# Patient Record
(demographics unavailable — no encounter records)

---

## 2024-10-17 NOTE — HISTORY OF PRESENT ILLNESS
[FreeTextEntry8] : Mr. Gonsalez is a 64 y/o [place] speaking male with history of  Concerned today regarding progressive R shoulder pain for x 2 weeks. Patient had been gardening, fell on concrete sustaining direct contact to the R shoulder and ribs. Range of motion has been restricted by pain. Point tenderness is palpable along R shoulder.  Advised to RTC in 1 mo for CPE- last visit

## 2024-12-10 NOTE — ASSESSMENT
[FreeTextEntry1] : 63M w/ DM, degenerate spine disease and non-obstructive CAD and HFrEF 30% Mixed NICM/ICM who presents for follow up   #Hx of Non-Obstructive CAD - LHC neg  > mod RCA disease - Cont ASA Statin  #HFrEF - Pt with newly reduced EF 30% from 50% with global rWMA - TTE: EF 30% globally reduced - Cath: 6/2024: 40% RCA IFR neg, mild LAD - CMR: Patchy subepicardial gadolinium enhancement c/w past viral myocarditis, EF 30% - GDMT:  > BB: MTP Succ 25 QDay  > ACE/ARB/ARNI: Lisinopril 5 QDay  > MRA: Spironolactone 25 QDay  > SGLT2: Could not afford, hold off for now  - Volume Status: euvolemic - Afterload: None - Diuretics: None Plan: - F/U repeat TTE on GDMT to look for interval improvement, can consider PPx AICD if EF remains <30% - No need for diuretics at this time - Encouraged to continue WL and good habits/diet - Consider lab work up and genetic work up of NICM at next visit if EF not improved   RTC 6 mths No labs no meds  TTE prior   Fredy Stacy PGY6 Cardiology Fellow  [facility]AB[facility] Maki

## 2024-12-10 NOTE — END OF VISIT
[] : Fellow [FreeTextEntry3] : Cardiac MRI reviewed with Imaging specialist Dr. Masterson. Suggestive of myocarditis. Plan for repeat TTE after 3 months of GDMT. If LVEF does not improve, will send a broader work-up of non-ischemic cardiomyopathy and consider genetic testing/ICD referral. Ensure patient is prescribed statin.

## 2024-12-10 NOTE — REASON FOR VISIT
[FreeTextEntry1] : 63M w/ DM, degenerate spine disease and non-obstructive CAD and HFrEF 30% Mixed NICM/ICM who presents for follow up   V 8/2024: Started low salt low fat diet, lost 30 lbs, refill Lipitor, can walk 4 blocks, tolerating GDMT, pending CMR, starting jardiance 10 QDay   IE: CMR, Following medicine, found to have torn rotator cuff, referred to ortho and given medical therapy/PT   Today patient reports he is doing ok. He notes he feels SOB in general during the winter, this is unchanged for years, he noted this occurs with rest and exertion day and night. It has no exertional component and always resolves when it gets warm. The cold makes it worse and he feels like he cannot pass air into his lungs, and denies any hx of asthma but has never been worked up. Regarding his HFrEF he never got the jardiance due to cost, however he endorses compliance with his other home meds. He has not had any LE edema orthopnea or PND. He forgot his BP log but thinks it is 110s/70s when he checks. No dizziness or syncope has occurred. His only complaint at this time is his torn rotator cuff that hurts constantly and he notes he cannot exercise, as well as BL hand numbness, denies any C spine issues. Will plan on continuing GDMT, repeat TTE to look for interval change in EF    CMR PROCEDURE DATE:  10/17/2024  INTERPRETATION:  HISTORY: Nonischemic cardiomyopathy workup. Coronary catheterization performed 6/17/2024 demonstrated 40% stenosis of the RCA. Echocardiogram performed to 2/12/2024 demonstrated left ventricular systolic function of 30% with global left ventricular hypokinesis and normal cavity size.  EXAMINATION: Cardiac MRI without and with Gadolinium.  TECHNIQUE: Cardiac MRI was performed before and after the administration of intravenous gadolinium. BSA 2.06 m2 based on height of 68 inches and weight of 220 pounds. FINDINGS: Chambers: The left ventricular cavity is normal in size. There is no left ventricular hypertrophy/thickening; for example the basal anterior wall of the left ventricle measures 9 mm in thickness at end diastole. The basal and mid lateral walls of the left ventricle appears dyskinetic and the remainder left ventricular myocardial wall segments appear globally hypokinetic. The left atrium is normal in size. The right ventricle is normal in size. There are no focal right ventricular wall motion abnormalities and function appears preserved The right atrium is normal in size. No immediate resting perfusion defects identified within the left ventricular myocardium after administration of gadolinium. There is subepicardial late gadolinium enhancement involving the basal and mid anterolateral and inferolateral walls of the left ventricle. LVEDV is 136 mL Index LVEDV is 66 mL/m2 LVESV is 95 mL Index LVESV is 46 mL/m2 LVSV is 41 mL LVEF is 30% Valves: Phase contrast sequence at the level of the aortic valve demonstrates total forward flow of 51 mL per beat and 2 mL of backward flow. The maximum velocity is 78 cm/s and maximum pressure gradient is 2 mmHg. No CMR evidence of valvular abnormality. Vessels: Unremarkable. Thorax: No pericardial effusion. No pleural effusion. No large mediastinal or axillary nodes identified. Imaged Abdomen: Unremarkable.  IMPRESSION:. There is subepicardial scarring involving the basal and mid anterolateral and inferolateral walls of the left ventricle. This finding is most compatible with viral myocarditis. The left ventricular basal and mid lateral walls appear dyskinetic and the remainder of the left ventricular myocardial wall segments appear hypokinetic. The calculated left ventricular ejection fraction is 30%.

## 2024-12-17 NOTE — PHYSICAL EXAM
[No Acute Distress] : no acute distress [Well Nourished] : well nourished [Well Developed] : well developed [Normal Sclera/Conjunctiva] : normal sclera/conjunctiva [EOMI] : extraocular movements intact [No Respiratory Distress] : no respiratory distress  [No Accessory Muscle Use] : no accessory muscle use [Clear to Auscultation] : lungs were clear to auscultation bilaterally [Normal Rate] : normal rate  [Regular Rhythm] : with a regular rhythm [Normal S1, S2] : normal S1 and S2 [No Murmur] : no murmur heard [Coordination Grossly Intact] : coordination grossly intact [No Focal Deficits] : no focal deficits [Normal Gait] : normal gait [Speech Grossly Normal] : speech grossly normal [Memory Grossly Normal] : memory grossly normal [Normal Affect] : the affect was normal [Alert and Oriented x3] : oriented to person, place, and time [Normal Mood] : the mood was normal [Normal Insight/Judgement] : insight and judgment were intact [de-identified] : in pain [de-identified] : pain to palpation and passive raise of left shoulder [de-identified] : no overlying rash, erythema or induration in left shoulder

## 2024-12-17 NOTE — HISTORY OF PRESENT ILLNESS
[FreeTextEntry1] : CPE  [de-identified] : James is 63 y.o M with a PMHx of newly diagnosed HFrEF of 30% and partial tear of the right shoulder who presents to the clinic for a CPE. He has had multiple ED visits regarding his left shoulder as it has been causing excruciating pain. MRI showed a lot of fissures, but patient cannot do PT because the the pain is too much. Pt states that the meloxicam and naproxen that he was prescribed does not alleviate much pain. Pt describes the pain as a sharp, constant pain, states that moving it around worsens the pain and nothing makes it better. Pt has tried cold and hot water and creams for the pain, denies using any steroid injections. Pt has an appt on the 15th with the orthopedist to assess whether surgery is necessary.

## 2024-12-17 NOTE — PHYSICAL EXAM
[No Acute Distress] : no acute distress [Well Nourished] : well nourished [Well Developed] : well developed [Normal Sclera/Conjunctiva] : normal sclera/conjunctiva [EOMI] : extraocular movements intact [No Respiratory Distress] : no respiratory distress  [No Accessory Muscle Use] : no accessory muscle use [Clear to Auscultation] : lungs were clear to auscultation bilaterally [Normal Rate] : normal rate  [Regular Rhythm] : with a regular rhythm [Normal S1, S2] : normal S1 and S2 [No Murmur] : no murmur heard [Coordination Grossly Intact] : coordination grossly intact [No Focal Deficits] : no focal deficits [Normal Gait] : normal gait [Speech Grossly Normal] : speech grossly normal [Memory Grossly Normal] : memory grossly normal [Normal Affect] : the affect was normal [Alert and Oriented x3] : oriented to person, place, and time [Normal Mood] : the mood was normal [Normal Insight/Judgement] : insight and judgment were intact [de-identified] : in pain [de-identified] : pain to palpation and passive raise of left shoulder [de-identified] : no overlying rash, erythema or induration in left shoulder

## 2024-12-17 NOTE — HISTORY OF PRESENT ILLNESS
[FreeTextEntry1] : CPE  [de-identified] : James is 63 y.o M with a PMHx of newly diagnosed HFrEF of 30% and partial tear of the right shoulder who presents to the clinic for a CPE. He has had multiple ED visits regarding his left shoulder as it has been causing excruciating pain. MRI showed a lot of fissures, but patient cannot do PT because the the pain is too much. Pt states that the meloxicam and naproxen that he was prescribed does not alleviate much pain. Pt describes the pain as a sharp, constant pain, states that moving it around worsens the pain and nothing makes it better. Pt has tried cold and hot water and creams for the pain, denies using any steroid injections. Pt has an appt on the 15th with the orthopedist to assess whether surgery is necessary.

## 2024-12-17 NOTE — HISTORY OF PRESENT ILLNESS
[FreeTextEntry1] : CPE  [de-identified] : James is 63 y.o M with a PMHx of newly diagnosed HFrEF of 30% and partial tear of the right shoulder who presents to the clinic for a CPE. He has had multiple ED visits regarding his left shoulder as it has been causing excruciating pain. MRI showed a lot of fissures, but patient cannot do PT because the the pain is too much. Pt states that the meloxicam and naproxen that he was prescribed does not alleviate much pain. Pt describes the pain as a sharp, constant pain, states that moving it around worsens the pain and nothing makes it better. Pt has tried cold and hot water and creams for the pain, denies using any steroid injections. Pt has an appt on the 15th with the orthopedist to assess whether surgery is necessary.

## 2024-12-17 NOTE — END OF VISIT
[] : Resident [FreeTextEntry3] : Pt in obvious pain, holding right shoulder and in distress. will s/w his cardiol about options for surgery eg cardiac anesthesia or local block, will also see Ortho next mo can try to move up the visit.

## 2024-12-17 NOTE — HISTORY OF PRESENT ILLNESS
[FreeTextEntry1] : CPE  [de-identified] : James is 63 y.o M with a PMHx of newly diagnosed HFrEF of 30% and partial tear of the right shoulder who presents to the clinic for a CPE. He has had multiple ED visits regarding his left shoulder as it has been causing excruciating pain. MRI showed a lot of fissures, but patient cannot do PT because the the pain is too much. Pt states that the meloxicam and naproxen that he was prescribed does not alleviate much pain. Pt describes the pain as a sharp, constant pain, states that moving it around worsens the pain and nothing makes it better. Pt has tried cold and hot water and creams for the pain, denies using any steroid injections. Pt has an appt on the 15th with the orthopedist to assess whether surgery is necessary.

## 2024-12-17 NOTE — PHYSICAL EXAM
[No Acute Distress] : no acute distress [Well Nourished] : well nourished [Well Developed] : well developed [Normal Sclera/Conjunctiva] : normal sclera/conjunctiva [EOMI] : extraocular movements intact [No Respiratory Distress] : no respiratory distress  [No Accessory Muscle Use] : no accessory muscle use [Clear to Auscultation] : lungs were clear to auscultation bilaterally [Normal Rate] : normal rate  [Regular Rhythm] : with a regular rhythm [Normal S1, S2] : normal S1 and S2 [No Murmur] : no murmur heard [Coordination Grossly Intact] : coordination grossly intact [No Focal Deficits] : no focal deficits [Normal Gait] : normal gait [Speech Grossly Normal] : speech grossly normal [Memory Grossly Normal] : memory grossly normal [Normal Affect] : the affect was normal [Alert and Oriented x3] : oriented to person, place, and time [Normal Mood] : the mood was normal [Normal Insight/Judgement] : insight and judgment were intact [de-identified] : in pain [de-identified] : pain to palpation and passive raise of left shoulder [de-identified] : no overlying rash, erythema or induration in left shoulder

## 2024-12-17 NOTE — PHYSICAL EXAM
[No Acute Distress] : no acute distress [Well Nourished] : well nourished [Well Developed] : well developed [Normal Sclera/Conjunctiva] : normal sclera/conjunctiva [EOMI] : extraocular movements intact [No Respiratory Distress] : no respiratory distress  [No Accessory Muscle Use] : no accessory muscle use [Clear to Auscultation] : lungs were clear to auscultation bilaterally [Normal Rate] : normal rate  [Regular Rhythm] : with a regular rhythm [Normal S1, S2] : normal S1 and S2 [No Murmur] : no murmur heard [Coordination Grossly Intact] : coordination grossly intact [No Focal Deficits] : no focal deficits [Normal Gait] : normal gait [Speech Grossly Normal] : speech grossly normal [Memory Grossly Normal] : memory grossly normal [Normal Affect] : the affect was normal [Alert and Oriented x3] : oriented to person, place, and time [Normal Mood] : the mood was normal [Normal Insight/Judgement] : insight and judgment were intact [de-identified] : in pain [de-identified] : pain to palpation and passive raise of left shoulder [de-identified] : no overlying rash, erythema or induration in left shoulder

## 2024-12-17 NOTE — PLAN
[FreeTextEntry1] : James is 63M with a PMHx of newly diagnosed HFrEF of 30% and partial tear of the right shoulder who presents to the clinic for a CPE.  #Shoulder Pain Plan: - continue meloxicam and naproxen, monitor for bleeding - 10 pills of oxycodone sent. take for breakthrough - tylenol 1000 mg 2-3 times a day - cardiology reached out for cardiology clearance - follow up with orthopedic surgery regarding  - will likely need anesthesia clearance  HFrEF Plan: -continue lisinopril and spironolactone  CAD Plan: -continue aspirin and statin  #HCM - Flu - got it from local pharmacy in July  - PCV - denies at this time.  - 2nd dose of shringix - Denied  - Tdap due 2026  - PSA done in 11/2023 - Pt states that he was scheduled for a colonoscopy but then his cardiologist told him to wait off on that.

## 2025-01-17 NOTE — HISTORY OF PRESENT ILLNESS
[de-identified] : James is 63 y.o M with a PMHx of newly diagnosed HFrEF of 30% and partial tear of the right shoulder who presents to the clinic for a follow up. At the last visit, he was recommended to see cardiology to optimize him for surgical interventions as the patient has not benefited from conservative therapy. He has not been able to see cardiology and notes his pain from his right shoulder is excruciating and he has limited benefit from conservative therapy. He gets most relief from opiates but even then the benefit is modest.

## 2025-01-17 NOTE — HISTORY OF PRESENT ILLNESS
[de-identified] : James is 63 y.o M with a PMHx of newly diagnosed HFrEF of 30% and partial tear of the right shoulder who presents to the clinic for a follow up. At the last visit, he was recommended to see cardiology to optimize him for surgical interventions as the patient has not benefited from conservative therapy. He has not been able to see cardiology and notes his pain from his right shoulder is excruciating and he has limited benefit from conservative therapy. He gets most relief from opiates but even then the benefit is modest.

## 2025-01-17 NOTE — INTERPRETER SERVICES
[Language Line ] : provided by [employer]   [Time Spent: ____ minutes] : Total time spent using  services: [unfilled] minutes. The patient's primary language is not English thus required  services. [Interpreters_IDNumber] : [id number] [Interpreters_FullName] : Zion [TWNoteComboBox1] : [place]ian

## 2025-01-17 NOTE — INTERPRETER SERVICES
[Language Line ] : provided by [employer]   [Time Spent: ____ minutes] : Total time spent using  services: [unfilled] minutes. The patient's primary language is not English thus required  services. [Interpreters_IDNumber] : [id number] [Interpreters_FullName] : Zion [TWNoteComboBox1] : Malaysian

## 2025-01-17 NOTE — PHYSICAL EXAM
[Normal Sclera/Conjunctiva] : normal sclera/conjunctiva [EOMI] : extraocular movements intact [No Respiratory Distress] : no respiratory distress  [No Accessory Muscle Use] : no accessory muscle use [Clear to Auscultation] : lungs were clear to auscultation bilaterally [Normal Rate] : normal rate  [Regular Rhythm] : with a regular rhythm [Normal S1, S2] : normal S1 and S2 [No Murmur] : no murmur heard [No Rash] : no rash [Coordination Grossly Intact] : coordination grossly intact [No Focal Deficits] : no focal deficits [Normal Gait] : normal gait [Speech Grossly Normal] : speech grossly normal [Memory Grossly Normal] : memory grossly normal [Alert and Oriented x3] : oriented to person, place, and time [Normal Insight/Judgement] : insight and judgment were intact [de-identified] : in pain [de-identified] : 15 degree of active right shoulder abduction and flexion. limited by pain [de-identified] : sad affect

## 2025-01-17 NOTE — PLAN
[FreeTextEntry1] : James is 63 y.o M with a PMHx of newly diagnosed HFrEF of 30% and partial tear of the right shoulder who presents to the clinic for a follow up. Currently in severe pain from the shoulder  Plan: #Right shoulder pain -follow up with cardiology regarding surgical clearance. verbally they said he is okay for surgery but there is no current documentation supporting that -continue the tylenol and diclofenac. will send a short course of oxycodone to bridge him to the orthopedic appointments  -has a follow up with orthopedic on January 15th, 2025  RTC in 10 weeks

## 2025-01-17 NOTE — PHYSICAL EXAM
[Normal Sclera/Conjunctiva] : normal sclera/conjunctiva [EOMI] : extraocular movements intact [No Respiratory Distress] : no respiratory distress  [No Accessory Muscle Use] : no accessory muscle use [Clear to Auscultation] : lungs were clear to auscultation bilaterally [Normal Rate] : normal rate  [Regular Rhythm] : with a regular rhythm [Normal S1, S2] : normal S1 and S2 [No Murmur] : no murmur heard [No Rash] : no rash [Coordination Grossly Intact] : coordination grossly intact [No Focal Deficits] : no focal deficits [Normal Gait] : normal gait [Speech Grossly Normal] : speech grossly normal [Memory Grossly Normal] : memory grossly normal [Alert and Oriented x3] : oriented to person, place, and time [Normal Insight/Judgement] : insight and judgment were intact [de-identified] : in pain [de-identified] : 15 degree of active right shoulder abduction and flexion. limited by pain [de-identified] : sad affect

## 2025-01-17 NOTE — PLAN
[FreeTextEntry1] : Jamse is 63 y.o M with a PMHx of newly diagnosed HFrEF of 30% and partial tear of the right shoulder who presents to the clinic for a follow up. Currently in severe pain from the shoulder  Plan: #Right shoulder pain -follow up with cardiology regarding surgical clearance. verbally they said he is okay for surgery but there is no current documentation supporting that -continue the tylenol and diclofenac. will send a short course of oxycodone to bridge him to the orthopedic appointments  -has a follow up with orthopedic on January 15th, 2025  RTC in 10 weeks

## 2025-01-17 NOTE — END OF VISIT
[] : Resident [FreeTextEntry3] : 35 minutes of total time was spent on the date of the encounter. This included time for review of medical records and laboratory results, face to face time (including the physical examination counseling and coordination of care), time for patient education, treatment plans, answering questions, communicating with other doctors and for medical record documentation.  The time spent is separate from time spent on separately billed procedures.      [Time Spent: ___ minutes] : I have spent [unfilled] minutes of time on the encounter which excludes teaching and separately reported services.

## 2025-06-10 NOTE — REASON FOR VISIT
[FreeTextEntry1] : 63M w/ DM, degenerate spine disease and non-obstructive CAD and HFrEF 30% Mixed NICM/ICM who presents for follow up  [facility] 12/2024: SOB during the winter, never got jardiance, /70s, torn rotator cuff, ordered repeat TTE   IE: Following medicine and ortho, holding off on surgery due to limited ROM had shoulder injection, TTE showing EF improvement  Today patient reports he got his jardiance and has been taking it without any issue. He has not had any CP LE edema or orthopnea. On occasion he has PONCE but nothing consistent. He does not check his BP at home, but previously noted it was normal. He has been having intermittent dry cough for a year, not sure if it correlates with his lisinopril. We spoke about switching to ARB just in case. Spoke to his daughter as well to confirm the plan.    CONCLUSIONS:   1. Left ventricular cavity is normal in size. Left ventricular wall thickness is normal. Left ventricular systolic function is moderately decreased with an ejection fraction of 42 % by Arias's method of disks. Global left ventricular hypokinesis.  2. Normal right ventricular cavity size and normal right ventricular systolic function.  3. No significant valvular disease.  4. No pericardial effusion seen.  5. Compared to the transthoracic echocardiogram performed on 2/12/2024, the LV function has improved slightly.

## 2025-06-10 NOTE — ASSESSMENT
[FreeTextEntry1] : 63M w/ DM, degenerate spine disease and non-obstructive CAD and HFrEF 30% Mixed NICM/ICM who presents for follow up  #Hx of Non-Obstructive CAD - LHC neg  > mod RCA disease - Cont ASA Statin  #HFmrEF - Pt with newly reduced EF 30% from 50% with global rWMA - TTE: EF 30% globally reduced --> 42% 2/2025 - Cath: 6/2024: 40% RCA IFR neg, mild LAD - CMR: Patchy subepicardial gadolinium enhancement c/w past viral myocarditis, EF 30% - GDMT:  > BB: MTP Succ 25 QDay  > ACE/ARB/ARNI: Lisinopril 5 QDay --> switch to losartan 12.5 QDay   > MRA: Spironolactone 25 QDay  > SGLT2: Jardiance - Volume Status: euvolemic - Afterload: None - Diuretics: None Plan: - F/U repeat TTE on GDMT shows interval improvement, no need for PPx AICD  - No need for diuretics at this time - Encouraged to continue WL and good habits/diet - Consider lab work up and genetic work up of NICM as needed   RTC 6 mths  Fredy Stacy PGY6 Cardiology Fellow  BABAK Hartman